# Patient Record
Sex: MALE | Race: WHITE | NOT HISPANIC OR LATINO
[De-identification: names, ages, dates, MRNs, and addresses within clinical notes are randomized per-mention and may not be internally consistent; named-entity substitution may affect disease eponyms.]

---

## 2021-05-17 ENCOUNTER — RESULT REVIEW (OUTPATIENT)
Age: 51
End: 2021-05-17

## 2022-05-17 ENCOUNTER — APPOINTMENT (OUTPATIENT)
Dept: ORTHOPEDIC SURGERY | Facility: CLINIC | Age: 52
End: 2022-05-17
Payer: COMMERCIAL

## 2022-05-17 ENCOUNTER — NON-APPOINTMENT (OUTPATIENT)
Age: 52
End: 2022-05-17

## 2022-05-17 PROCEDURE — 73030 X-RAY EXAM OF SHOULDER: CPT | Mod: RT

## 2022-05-17 PROCEDURE — 99204 OFFICE O/P NEW MOD 45 MIN: CPT

## 2022-05-19 NOTE — DISCUSSION/SUMMARY
[de-identified] : KIM HERNANDEZ is a 51 year y/o male who presents for initial visit of BILAT shoulder pain (R>L). He reports constantly worsening pain since past 1.5 yrs with no specific RODOLFO. In regards to his Right, he reports pain bv supraspinatus attachment, and in GH joint. He denies numbness or tingling down to extremity. He reports pain with FF, ER, ABD. In regards to his Left shoulder, patient describes dull achy pain that is worsened with shoulder movements. He endorses crunching and popping inside shoulder joints some of it is painful. Of note, patient has "back surgery" about yr and half ago. Patient is quite active at this time, and avid . Patient has BILAT shoulder cortisone injection 5 months ago by his PCP. \par \par We had a thorough discussion regarding the nature of his pain, the pathophysiology, as well as all treatment options. In regards to his Left shoulder, I discussed the treatment of degenerative arthritis with the patient at length today. I described the spectrum of treatment from nonoperative modalities to total joint arthroplasty. Noninvasive and nonoperative treatment modalities include weight reduction, activity modification with low impact exercise, PRN use of acetaminophen or anti-inflammatory medication if tolerated, glucosamine/chondroitin supplements, and physical therapy. Further treatments can include corticosteroid injection and the use of hyaluronic acid injections. Definitive treatment can certainly include total joint arthroplasty also. The risks and benefits of each treatment options was discussed and all questions were answered.\par \par In regards to his Right shoulder, based on his clinical exam, and radiographs, he has OA along with calcific tendinitis of which I discussed operative and non-operative treatment modalities. The patient presents today with acute onset of Left shoulder pain consistent with a clinical diagnosis of calcific tendinopathy. Radiographs show a calcification at the rotator cuff insertion. Clinically, the patient specific point tenderness to this location as well as positive impingement signs. I discussed the pathophysiology of the diagnosis and the distinction between the resorptive and formative phase.  I discussed the treatment of calcific tendinitis with the patient at length today and the inflammatory process it incites during resorptive phase.\par \par I discussed the treatment of calcific tendinitis with the patient including nonoperative management as first line treatment. Anti-inflammatory medications (NSAID's) with physical therapy for strengthening and conditioning of the joint to preserve shoulder range of motion is typically required. Corticosteroid injection and/or US guided percutaneous aspiration and lavage may be indicated for refractory cases and for acute symptomatic pain relief. If nonoperative management fails, arthroscopic debridement with possible rotator cuff repair if damaged during surgery may be required for recalcitrant cases.\par \par An order for US guided percutaneous lavage and aspiration for Right shoulder was placed to be performed by radiologist. Conservative measures of treatment include rest until asymptomatic, activity avoidance, NSAID's PRN, application to ice to the area 2-3x daily for 20 minutes, with gradual return to activities. Patient will follow up in 6-8 wks for repeat clinical assessment.All questions were answered and the patient verbalized understanding. The patient is in agreement with this treatment plan. \par \par \par \par

## 2022-05-19 NOTE — PHYSICAL EXAM
[de-identified] : Physical Exam:\par General: Well appearing, no acute distress\par Neurologic: A&Ox3, No focal deficits\par Head: NCAT without abrasions, lacerations, or ecchymosis to head, face, or scalp\par Eyes: No scleral icterus, no gross abnormalities\par Respiratory: Equal chest wall expansion bilaterally, no accessory muscle use\par Lymphatic: No lymphadenopathy palpated\par Skin: Warm and dry\par Psychiatric: Normal mood and affect\par \par Left Shoulder\par ·	Inspection/Palpation: no tenderness, swelling or deformities\par ·	Range of Motion: no crepitus with ROM; Active ; ER at side 15; IR to L1; Passive ; ER at side 15; IR to L1\par ·	Strength: forward elevation in scapular plane [4/5], internal rotation [4/5], external rotation [4/5], adduction [4/5] and abduction [4/5]\par ·	Stability: no joint instability on provocative testing\par ·	Tests: Suarez test positive, Neer positive, positive drop arm test secondary to pain, bear hug test positive, Napolean sign negative, cross arm adduction negative, lift off sign positive, hornblowers sign negative, speeds test negative, Yergason's test negative, no bicipital groove tenderness, Guthrie's Active Compression test negative, whipple test POS, bicep's load II test negative\par \par Right Shoulder\par ·	Inspection/Palpation: Tenderness at supraspinatus insertion, no swelling or deformities\par ·	Range of Motion: no crepitus with ROM; Active FF 0-130 w/ hitching; ER at side 50; IR to back pocket with pain ; Passive FF 0-90; ER at side 45; IR to back pocket with pain\par ·	Strength: forward elevation in scapular plane [4/5], internal rotation [4/5], external rotation [4/5], adduction [4/5] and abduction [4/5]\par ·	Stability: no joint instability on provocative testing\par ·	Tests: Suarez test negative, Neer sign negative, POS drop arm test secondary to pain, bear hug test POS, Napolean sign POS, cross arm adduction POS, lift off sign positive, hornblowers sign negative, speeds test POS, Yergason's test POS, no bicipital groove tenderness, Guthrie's Active Compression test POS  [de-identified] : The following radiographs were ordered and read by me during this patients visit. I reviewed each radiograph in detail with the patient and discussed the findings as highlighted below. \par \par 4 views of the Right shoulder show calcium deposit noted, no fracture, dislocation or bony lesions. There is evidence of degenerative change in the glenohumeral and acromioclavicular joints with moderate narrowing of the joint space. Otherwise unremarkable. \par \par 4 views of the Left shoulder show no fracture, dislocation or bony lesions. There is evidence of degenerative change in the glenohumeral and acromioclavicular joints with moderate narrowing of the joint space. Otherwise unremarkable.

## 2022-05-19 NOTE — HISTORY OF PRESENT ILLNESS
[Worsening] : worsening [___ yrs] : [unfilled] year(s) ago [4] : a current pain level of 4/10 [6] : an average pain level of 6/10 [Lifting] : worsened by lifting [None] : No relieving factors are noted [de-identified] : KIM HERNANDEZ is a 51 year y/o male who presents for initial visit of BILAT shoulder pain (R>L). He reports constantly worsening pain since past 1.5 yrs with no specific RODOLFO. In regards to his Right, he reports pain by supraspinatus attachment, and in GH joint. He denies numbness or tingling down to extremity. He reports pain with FF, ER, ABD. He reports occasional pain in his biceps tendon, but it improves with time. In regards to his Left shoulder, patient describes dull achy pain that is worsened with shoulder movements. He endorses crunching and popping inside shoulder joints some of it is painful. Of note, patient has "back surgery" about yr and half ago. Patient is quite active at this time, and avid . Patient has BILAT shoulder cortisone injection 5 months ago by his PCP. He occasionally takes NSAIDs and acetaminophen that provides relief.

## 2022-05-25 ENCOUNTER — RESULT REVIEW (OUTPATIENT)
Age: 52
End: 2022-05-25

## 2022-05-25 ENCOUNTER — APPOINTMENT (OUTPATIENT)
Dept: ULTRASOUND IMAGING | Facility: CLINIC | Age: 52
End: 2022-05-25
Payer: COMMERCIAL

## 2022-05-25 ENCOUNTER — OUTPATIENT (OUTPATIENT)
Dept: OUTPATIENT SERVICES | Facility: HOSPITAL | Age: 52
LOS: 1 days | End: 2022-05-25
Payer: COMMERCIAL

## 2022-05-25 DIAGNOSIS — M75.31 CALCIFIC TENDINITIS OF RIGHT SHOULDER: ICD-10-CM

## 2022-05-25 PROCEDURE — 20611 DRAIN/INJ JOINT/BURSA W/US: CPT | Mod: RT

## 2022-05-25 PROCEDURE — 20611 DRAIN/INJ JOINT/BURSA W/US: CPT

## 2022-07-26 ENCOUNTER — APPOINTMENT (OUTPATIENT)
Dept: ORTHOPEDIC SURGERY | Facility: CLINIC | Age: 52
End: 2022-07-26

## 2022-07-26 VITALS — BODY MASS INDEX: 31.39 KG/M2 | HEIGHT: 67 IN | WEIGHT: 200 LBS

## 2022-07-26 PROCEDURE — 99214 OFFICE O/P EST MOD 30 MIN: CPT

## 2022-07-26 NOTE — ASSESSMENT
[FreeTextEntry1] : R and L GH DJD, R calcific tendonitis. \par He takes aleve.\par Discussed op versus non op tx, including the r/b/a/c of both.\par Discussed TSA, he would need CT, MRI.\par Discussed trial of CSI, visco.\par MRI b/l shoulders to eval for RCT, calcific tendonitis. \par He has tried activity modification, NSAIDs and injections for years. \par RTO after MRI

## 2022-07-26 NOTE — PHYSICAL EXAM
[Bilateral] : shoulder bilaterally [] : no erythema [5 ___] : forward flexion 5[unfilled]/5 [5___] : external rotation 5[unfilled]/5 [FreeTextEntry9] : FF: R 140  L 125\par ER: R 40  L 40

## 2022-07-26 NOTE — HISTORY OF PRESENT ILLNESS
[6] : 6 [4] : 4 [Throbbing] : throbbing [Full time] : Work status: full time [de-identified] : 7/26/22: Here for follow up. Had calcific tendonitis aspiration May 22. Continues to have pain with HEP. Has been taking NSAIDs and has done PT with minimal relief.\par \par 11/18/21: 52 yo RHD M with bilateral R>L shoulder pain for the fast few weeks with no specific\par injury. He feels pain more anterior and deep, some into the biceps area. Denies radicular pain, parethesias. He has been\par resting from the gym for the past 2 weeks. Tried Aleve and Motrin to mild benefit. \par Lumbar Laminectomy 10/27/20 [] : no [FreeTextEntry1] : b/l shoulders [de-identified] : Nov 2021 [de-identified] : cortisone injections [de-identified] : Dr. Perez

## 2022-08-29 ENCOUNTER — RESULT REVIEW (OUTPATIENT)
Age: 52
End: 2022-08-29

## 2022-09-01 ENCOUNTER — APPOINTMENT (OUTPATIENT)
Dept: ORTHOPEDIC SURGERY | Facility: CLINIC | Age: 52
End: 2022-09-01

## 2022-09-01 VITALS — WEIGHT: 200 LBS | HEIGHT: 67 IN | BODY MASS INDEX: 31.39 KG/M2

## 2022-09-01 DIAGNOSIS — M75.31 CALCIFIC TENDINITIS OF RIGHT SHOULDER: ICD-10-CM

## 2022-09-01 PROCEDURE — 99215 OFFICE O/P EST HI 40 MIN: CPT

## 2022-09-01 NOTE — HISTORY OF PRESENT ILLNESS
[6] : 6 [4] : 4 [Throbbing] : throbbing [Full time] : Work status: full time [de-identified] : 9/1/22: Here for MRI review.\par \par R shoulder MRI:\par MRI of the right shoulder demonstrates moderate osteoarthritic changes, not\par progressed in comparison to imaging from 2018, with SLAP tear undermining the\par biceps of a complex, and a small-volume joint effusion decompressing the biceps\par tendon sheath.\par Multifocal rotator cuff tendinosis with moderate fraying of the supraspinatus\par infraspinatus tendons without a full-thickness defect or tendon retraction and\par superimposed bulky calcific tendinitis involving the supraspinatus tendon\par extending into the junctional fibers. Questionable features of capsulitis.\par Severe AC joint arthrosis with mild subacromial subdeltoid bursitis.\par \par L shoulder MRI:\par MRI of the left shoulder demonstrate severe glenohumeral osteoarthritic changes\par with features of full-thickness cartilage loss and bone-on-bone apposition,\par maceration of the labrum with SLAP tear undermining the biceps complex, and\par small volume joint effusion with calcified loose fragments within the superior\par posterior aspect of the joint.\par \par Multifocal rotator cuff tendinosis with moderate fraying of the supraspinatus\par infraspinatus tendons with partial thickness tears involving the far anterior\par fibers of the supraspinatus tendon and interstitial tears of the mid fibers of\par the subscapularis tendon without a full-thickness defect or tendon retraction.\par Questionable features of capsulitis. Longitudinal split tear of the long head of\par biceps tendon with intra-articular tendinosis. Severe AC joint arthrosis with\par mild subacromial subdeltoid bursitis.\par \par 7/26/22: Here for follow up. Had calcific tendonitis aspiration May 22. Continues to have pain with HEP. Has been taking NSAIDs and has done PT with minimal relief.\par \par 11/18/21: 52 yo RHD M with bilateral R>L shoulder pain for the fast few weeks with no specific\par injury. He feels pain more anterior and deep, some into the biceps area. Denies radicular pain, parethesias. He has been\par resting from the gym for the past 2 weeks. Tried Aleve and Motrin to mild benefit. \par Lumbar Laminectomy 10/27/20 [] : no [FreeTextEntry1] : b/l shoulders [de-identified] : Nov 2021 [de-identified] : Dr. Perez [de-identified] : cortisone injections

## 2022-09-01 NOTE — ASSESSMENT
[FreeTextEntry1] : R and L GH DJD, R calcific tendonitis. \par MRIs reviewed: GH DJD, PRCTs, R shoulder calcification.\par Discussed op versus non op tx, including the r/b/a/c of both.\par Discussed TSA, he would need CT.\par Discussed trial of CSI, visco.\par He has tried activity modification, NSAIDs and injections for years. \par He will continue aleve, activity modification. \par RTO prn.

## 2022-09-01 NOTE — DATA REVIEWED
[MRI] : MRI [Bilateral] : of the bilateral [Shoulder] : shoulder [I independently reviewed and interpreted images and report] : I independently reviewed and interpreted images and report [FreeTextEntry1] : ROSEANNE BUTT, Jerry, R shoulder calcification

## 2022-09-01 NOTE — PHYSICAL EXAM
[Bilateral] : shoulder bilaterally [5 ___] : forward flexion 5[unfilled]/5 [5___] : external rotation 5[unfilled]/5 [] : no erythema [FreeTextEntry9] : FF: R 140  L 125\par ER: R 40  L 40

## 2022-10-28 ENCOUNTER — APPOINTMENT (OUTPATIENT)
Dept: ORTHOPEDIC SURGERY | Facility: CLINIC | Age: 52
End: 2022-10-28

## 2022-10-28 DIAGNOSIS — M19.012 PRIMARY OSTEOARTHRITIS, LEFT SHOULDER: ICD-10-CM

## 2022-10-28 DIAGNOSIS — M19.011 PRIMARY OSTEOARTHRITIS, RIGHT SHOULDER: ICD-10-CM

## 2022-10-28 PROCEDURE — 20611 DRAIN/INJ JOINT/BURSA W/US: CPT | Mod: RT

## 2022-10-28 PROCEDURE — J3490M: CUSTOM | Mod: LT

## 2022-10-28 PROCEDURE — 99214 OFFICE O/P EST MOD 30 MIN: CPT | Mod: 25

## 2022-10-28 NOTE — PHYSICAL EXAM
[Bilateral] : shoulder bilaterally [] : pain with external rotation [5 ___] : forward flexion 5[unfilled]/5 [5___] : external rotation 5[unfilled]/5 [FreeTextEntry9] : FF: R 140  L 125\par ER: R 40  L 40

## 2022-10-28 NOTE — HISTORY OF PRESENT ILLNESS
[6] : 6 [4] : 4 [Throbbing] : throbbing [Full time] : Work status: full time [de-identified] : 9/1/22: Here for MRI review.\par \par R shoulder MRI:\par MRI of the right shoulder demonstrates moderate osteoarthritic changes, not progressed in comparison to imaging from 2018, with SLAP tear undermining the biceps of a complex, and a small-volume joint effusion decompressing the biceps tendon sheath. Multifocal rotator cuff tendinosis with moderate fraying of the supraspinatus and infraspinatus tendons without a full-thickness defect or tendon retraction and superimposed bulky calcific tendinitis involving the supraspinatus tendon extending into the junctional fibers. Questionable features of capsulitis. Severe AC joint arthrosis with mild subacromial subdeltoid bursitis.\par \par L shoulder MRI:\par MRI of the left shoulder demonstrate severe glenohumeral osteoarthritic changes with features of full-thickness cartilage loss and bone-on-bone apposition, maceration of the labrum with SLAP tear undermining the biceps complex, and small volume joint effusion with calcified loose fragments within the superior posterior aspect of the joint.\par \par Multifocal rotator cuff tendinosis with moderate fraying of the supraspinatus infraspinatus tendons with partial thickness tears involving the far anterior fibers of the supraspinatus tendon and interstitial tears of the mid fibers of the subscapularis tendon without a full-thickness defect or tendon retraction. Questionable features of capsulitis. Longitudinal split tear of the long head of biceps tendon with intra-articular tendinosis. Severe AC joint arthrosis with mild subacromial subdeltoid bursitis.\par \par 7/26/22: Here for follow up. Had calcific tendonitis aspiration May 22. Continues to have pain with HEP. Has been taking NSAIDs and has done PT with minimal relief.\par \par 11/18/21: 52 yo RHD M with bilateral R>L shoulder pain for the fast few weeks with no specific injury. He feels pain more anterior and deep, some into the biceps area. Denies radicular pain, parethesias. He has been resting from the gym for the past 2 weeks. Tried Aleve and Motrin to mild benefit. \par Lumbar Laminectomy 10/27/20 [] : no [FreeTextEntry1] : b/l shoulders [de-identified] : Nov 2021 [de-identified] : Dr. Perez [de-identified] : cortisone injections

## 2022-10-28 NOTE — ASSESSMENT
[FreeTextEntry1] : R and L GH DJD, R calcific tendonitis. \par MRIs reviewed: GH DJD, PRCTs, R shoulder calcification.\par Discussed op versus non op tx, including the r/b/a/c of both.\par Discussed TSA, he would need CT.\par He has tried activity modification, NSAIDs and injections for years. \par We will try b/l GH injections. \par He will continue aleve, activity modification. \par RTO prn. \par \par Procedure Note:\par Large Joint Injection was performed because of pain and inflammation, failure of conservative treatment.  \par Medications:\par Depo-Medrol: 1 cc, 80 mg.\par Lidocaine: 2 cc, 1%. \par Marcaine: 2 cc, .25%. \par \par Medication was injected in the bilateral glenohumeral joints. Patient has tried OTC's including aspirin, Ibuprofen, Aleve etc or prescription NSAIDs, and/or exercises at home and/ or physical therapy without satisfactory response. The risks, benefits, and alternatives to cortisone injection were explained in full to the patient. Risks outlined include but are not limited to infection, sepsis, bleeding, scarring, skin discoloration, temporary increase in pain, syncopal episode, failure to resolve symptoms, allergic reaction, symptom recurrence, and elevation of blood sugar in diabetics. Patient understood the risks. All questions were answered. After discussion of options, patient requested an injection. Oral informed consent was obtained and sterile prep of the injection site was performed using alcohol. Sterile technique was utilized for the procedure including the preparation of the solutions used for the injection. Ethyl chloride spray was used topically.  Sterile technique used. Patient tolerated procedure well. Post Procedure Instructions: Patient was advised to call if redness, pain, or fever occur and apply ice for 15 min. out of every hour for the next 12-24 hours as tolerated. patient was advised to rest the joint(s) for 2 days.\par \par Ultrasound Guidance was used for the following reasons: for Glenohumeral injection. \par Ultrasound guided injection was performed of the shoulder, visualization of the needle and placement of injection was performed without complication.\par \par

## 2024-03-29 ENCOUNTER — APPOINTMENT (OUTPATIENT)
Dept: CT IMAGING | Facility: CLINIC | Age: 54
End: 2024-03-29
Payer: SELF-PAY

## 2024-03-29 PROCEDURE — 75571 CT HRT W/O DYE W/CA TEST: CPT
